# Patient Record
Sex: FEMALE
[De-identification: names, ages, dates, MRNs, and addresses within clinical notes are randomized per-mention and may not be internally consistent; named-entity substitution may affect disease eponyms.]

---

## 2024-03-17 ENCOUNTER — APPOINTMENT (OUTPATIENT)
Dept: ORTHOPEDIC SURGERY | Facility: CLINIC | Age: 44
End: 2024-03-17
Payer: SELF-PAY

## 2024-03-17 ENCOUNTER — NON-APPOINTMENT (OUTPATIENT)
Age: 44
End: 2024-03-17

## 2024-03-17 VITALS — WEIGHT: 122 LBS | BODY MASS INDEX: 21.62 KG/M2 | HEIGHT: 63 IN

## 2024-03-17 DIAGNOSIS — S92.251A DISPLACED FRACTURE OF NAVICULAR [SCAPHOID] OF RIGHT FOOT, INITIAL ENCOUNTER FOR CLOSED FRACTURE: ICD-10-CM

## 2024-03-17 DIAGNOSIS — G40.909 EPILEPSY, UNSPECIFIED, NOT INTRACTABLE, W/OUT STATUS EPILEPTICUS: ICD-10-CM

## 2024-03-17 PROBLEM — Z00.00 ENCOUNTER FOR PREVENTIVE HEALTH EXAMINATION: Status: ACTIVE | Noted: 2024-03-17

## 2024-03-17 PROCEDURE — 99204 OFFICE O/P NEW MOD 45 MIN: CPT | Mod: 25

## 2024-03-17 RX ORDER — LAMOTRIGINE 100 MG/1
100 TABLET ORAL
Refills: 0 | Status: ACTIVE | COMMUNITY

## 2024-03-17 NOTE — ASSESSMENT
[FreeTextEntry1] : 43-year-old female here for evaluation status post a right navicular avulsion fracture.  Patient reports she is ago she was walking on steps when she twisted her foot.  She was seen in urgent care x-rays taken which confirmed an acute closed mildly displaced avulsion fracture of the right navicular of the right foot.  She was placed in a splint and advised to follow-up with an orthopedic specialist.  Physical examination of the right foot: Mild swelling appreciated over the top of the foot.  No ecchymosis or erythema appreciated skin is intact but tender palpation over the navicular as well as over the ATFL and PTFL of the right foot.  No tenderness of lateral medial malleolus.  No tenderness of the other metatarsals.  No tenderness to the Lisfranc.  Good strength throughout.  Sensorimotor intact distally.  Neuro vas intact.  Achilles tendon is intact.  Calf is soft and nontender.  Good range of motion.  No instability.  X-rays of right foot reviewed from urgent care system: Acute closed mildly displaced avulsion fracture of the navicular of the right foot.  No subluxations or dislocations.  I placed the patient in a short cam walker boot in the office today.  She may bear weight as tolerated.  She likely has a concurrent ankle sprain on top of her navicular fracture.  She will utilize crutches to assist in the ambulation process.  I advised anti-inflammatory medication as needed for pain.  Advised her to rest, ice, compress, and elevate the right foot.  She lives in [place] and will not follow-up with us.  Red flag symptoms discussed.  Treatment plan moving forward was discussed with the patient.  She expresses full understanding.  She will call with any questions or concerns for us in the future. All questions and concerns addressed to patient's satisfaction. Patient expresses full understanding of treatment plan.